# Patient Record
Sex: MALE | URBAN - METROPOLITAN AREA
[De-identification: names, ages, dates, MRNs, and addresses within clinical notes are randomized per-mention and may not be internally consistent; named-entity substitution may affect disease eponyms.]

---

## 2021-03-24 ENCOUNTER — WEB ENCOUNTER (OUTPATIENT)
Dept: URBAN - METROPOLITAN AREA CLINIC 105 | Facility: CLINIC | Age: 46
End: 2021-03-24

## 2021-04-07 ENCOUNTER — LAB OUTSIDE AN ENCOUNTER (OUTPATIENT)
Dept: URBAN - METROPOLITAN AREA CLINIC 105 | Facility: CLINIC | Age: 46
End: 2021-04-07

## 2021-04-07 ENCOUNTER — OFFICE VISIT (OUTPATIENT)
Dept: URBAN - METROPOLITAN AREA CLINIC 105 | Facility: CLINIC | Age: 46
End: 2021-04-07
Payer: COMMERCIAL

## 2021-04-07 VITALS
SYSTOLIC BLOOD PRESSURE: 130 MMHG | HEIGHT: 60 IN | DIASTOLIC BLOOD PRESSURE: 84 MMHG | WEIGHT: 102.6 LBS | TEMPERATURE: 97.5 F | BODY MASS INDEX: 20.14 KG/M2 | HEART RATE: 79 BPM

## 2021-04-07 DIAGNOSIS — Z12.11 COLON CANCER SCREENING: ICD-10-CM

## 2021-04-07 PROCEDURE — 99202 OFFICE O/P NEW SF 15 MIN: CPT | Performed by: INTERNAL MEDICINE

## 2021-04-07 RX ORDER — LISINOPRIL 20 MG/1
1 TABLET TABLET ORAL ONCE A DAY
Status: ACTIVE | COMMUNITY

## 2021-04-07 RX ORDER — ABACAVIR SULFATE, DOLUTEGRAVIR SODIUM, LAMIVUDINE 600; 50; 300 MG/1; MG/1; MG/1
1 TABLET TABLET, FILM COATED ORAL ONCE A DAY
Status: ACTIVE | COMMUNITY

## 2021-04-07 NOTE — HPI-TODAY'S VISIT:
The patient presents for a colonoscopy.   Today, the patient says he has never had a colon. He was told that he is taking blood pressure medication for proteinuria. He denies a history of colon cancer.  Alchol use is 1x/week.

## 2021-04-12 ENCOUNTER — OFFICE VISIT (OUTPATIENT)
Dept: URBAN - METROPOLITAN AREA SURGERY CENTER 16 | Facility: SURGERY CENTER | Age: 46
End: 2021-04-12
Payer: COMMERCIAL

## 2021-04-12 DIAGNOSIS — Z12.11 COLON CANCER SCREENING: ICD-10-CM

## 2021-04-12 PROCEDURE — G8907 PT DOC NO EVENTS ON DISCHARG: HCPCS | Performed by: INTERNAL MEDICINE

## 2021-04-12 PROCEDURE — G0121 COLON CA SCRN NOT HI RSK IND: HCPCS | Performed by: INTERNAL MEDICINE

## 2021-04-12 RX ORDER — LISINOPRIL 20 MG/1
1 TABLET TABLET ORAL ONCE A DAY
Status: ACTIVE | COMMUNITY

## 2021-04-12 RX ORDER — ABACAVIR SULFATE, DOLUTEGRAVIR SODIUM, LAMIVUDINE 600; 50; 300 MG/1; MG/1; MG/1
1 TABLET TABLET, FILM COATED ORAL ONCE A DAY
Status: ACTIVE | COMMUNITY

## 2023-04-14 ENCOUNTER — WEB ENCOUNTER (OUTPATIENT)
Dept: URBAN - METROPOLITAN AREA CLINIC 105 | Facility: CLINIC | Age: 48
End: 2023-04-14

## 2023-04-19 ENCOUNTER — DASHBOARD ENCOUNTERS (OUTPATIENT)
Age: 48
End: 2023-04-19

## 2023-04-19 ENCOUNTER — OFFICE VISIT (OUTPATIENT)
Dept: URBAN - METROPOLITAN AREA CLINIC 105 | Facility: CLINIC | Age: 48
End: 2023-04-19
Payer: COMMERCIAL

## 2023-04-19 VITALS
TEMPERATURE: 97.8 F | HEART RATE: 90 BPM | HEIGHT: 70 IN | WEIGHT: 184 LBS | DIASTOLIC BLOOD PRESSURE: 80 MMHG | BODY MASS INDEX: 26.34 KG/M2 | SYSTOLIC BLOOD PRESSURE: 136 MMHG

## 2023-04-19 DIAGNOSIS — B20 HIV INFECTION, UNSPECIFIED SYMPTOM STATUS: ICD-10-CM

## 2023-04-19 DIAGNOSIS — R79.89 ABNORMAL LFTS: ICD-10-CM

## 2023-04-19 DIAGNOSIS — R76.8 POSITIVE HEPATITIS C ANTIBODY TEST: ICD-10-CM

## 2023-04-19 PROBLEM — 86406008: Status: ACTIVE | Noted: 2023-04-19

## 2023-04-19 PROCEDURE — 99213 OFFICE O/P EST LOW 20 MIN: CPT | Performed by: INTERNAL MEDICINE

## 2023-04-19 RX ORDER — ABACAVIR SULFATE, DOLUTEGRAVIR SODIUM, LAMIVUDINE 600; 50; 300 MG/1; MG/1; MG/1
1 TABLET TABLET, FILM COATED ORAL ONCE A DAY
Status: ACTIVE | COMMUNITY

## 2023-04-19 RX ORDER — LISINOPRIL 20 MG/1
1 TABLET TABLET ORAL ONCE A DAY
Status: ACTIVE | COMMUNITY

## 2023-04-19 NOTE — HPI-TODAY'S VISIT:
The patient presents for a colonoscopy.   Today, the patient says he has never had a colon. He was told that he is taking blood pressure medication for proteinuria. He denies a history of colon cancer.  Alchol use is 1x/week. - 4/19/2023- He  Comes for evaluation of a positive hepatitis-C antibody.  He says that several weeks to months ago his urine turned dark.  That has resolved currently

## 2023-04-28 LAB
A/G RATIO: 1.4
AFP, SERUM, TUMOR MARKER: 9.3
ALBUMIN: 3.9
ALKALINE PHOSPHATASE: 103
ALPHA 2-MACROGLOBULINS, QN: 173
ALT (SGPT): 41
ALT: 18
APOLIPOPROTEIN A-1: 162
AST (SGOT): 25
BILIRUBIN, TOTAL: 0.4
BILIRUBIN, TOTAL: 0.8
BUN/CREATININE RATIO: 8
BUN: 11
CALCIUM: 9.4
CARBON DIOXIDE, TOTAL: 29
CHLORIDE: 105
CREATININE: 1.38
EGFR: 63
FIBROSIS INTERPRETATION: (no result)
FIBROSIS SCORE: 0.17
FIBROSIS STAGE: (no result)
FOOTNOTE: (no result)
GGT: 12
GLOBULIN, TOTAL: 2.8
GLUCOSE: 70
HAPTOGLOBIN: 37
HEMATOCRIT: 39.2
HEMOGLOBIN: 13.9
HEPATITIS B CORE AB TOTAL: REACTIVE
HEPATITIS B SURFACE AB IMMUNITY, QN: 192
HEPATITIS B SURFACE ANTIGEN: (no result)
HEPATITIS C VIRAL RNA: NOT DETECTED
MCH: 34.2
MCHC: 35.5
MCV: 96.6
MPV: 10.8
NECROINFLAMMAT ACTIVITY GRADE: (no result)
NECROINFLAMMAT ACTIVITY SCORE: 0.06
NECROINFLAMMAT INTERP: (no result)
PLATELET COUNT: 284
POTASSIUM: 4.3
PROTEIN, TOTAL: 6.7
RDW: 13.8
RED BLOOD CELL COUNT: 4.06
REFERENCE ID: (no result)
SODIUM: 143
WHITE BLOOD CELL COUNT: 5.4

## 2023-06-21 ENCOUNTER — WEB ENCOUNTER (OUTPATIENT)
Dept: URBAN - METROPOLITAN AREA CLINIC 105 | Facility: CLINIC | Age: 48
End: 2023-06-21